# Patient Record
Sex: FEMALE | Race: WHITE | NOT HISPANIC OR LATINO | Employment: FULL TIME | URBAN - METROPOLITAN AREA
[De-identification: names, ages, dates, MRNs, and addresses within clinical notes are randomized per-mention and may not be internally consistent; named-entity substitution may affect disease eponyms.]

---

## 2018-03-30 ENCOUNTER — OFFICE VISIT (OUTPATIENT)
Dept: LAB | Age: 58
End: 2018-03-30
Payer: COMMERCIAL

## 2018-03-30 ENCOUNTER — TRANSCRIBE ORDERS (OUTPATIENT)
Dept: ADMINISTRATIVE | Age: 58
End: 2018-03-30

## 2018-03-30 DIAGNOSIS — E05.90 PRETIBIAL MYXEDEMA: ICD-10-CM

## 2018-03-30 DIAGNOSIS — E89.0 POSTSURGICAL HYPOTHYROIDISM: ICD-10-CM

## 2018-03-30 DIAGNOSIS — R73.01 IMPAIRED FASTING GLUCOSE: ICD-10-CM

## 2018-03-30 DIAGNOSIS — R73.01 IMPAIRED FASTING GLUCOSE: Primary | ICD-10-CM

## 2018-03-30 LAB
ATRIAL RATE: 52 BPM
P AXIS: 48 DEGREES
PR INTERVAL: 144 MS
QRS AXIS: 38 DEGREES
QRSD INTERVAL: 78 MS
QT INTERVAL: 424 MS
QTC INTERVAL: 394 MS
T WAVE AXIS: 38 DEGREES
VENTRICULAR RATE: 52 BPM

## 2018-03-30 PROCEDURE — 93005 ELECTROCARDIOGRAM TRACING: CPT

## 2018-05-14 ENCOUNTER — OFFICE VISIT (OUTPATIENT)
Dept: URGENT CARE | Age: 58
End: 2018-05-14
Payer: COMMERCIAL

## 2018-05-14 VITALS
RESPIRATION RATE: 18 BRPM | DIASTOLIC BLOOD PRESSURE: 60 MMHG | BODY MASS INDEX: 30.73 KG/M2 | TEMPERATURE: 98.1 F | SYSTOLIC BLOOD PRESSURE: 110 MMHG | HEIGHT: 66 IN | HEART RATE: 79 BPM | OXYGEN SATURATION: 96 % | WEIGHT: 191.2 LBS

## 2018-05-14 DIAGNOSIS — J06.9 VIRAL URI: Primary | ICD-10-CM

## 2018-05-14 PROCEDURE — S9083 URGENT CARE CENTER GLOBAL: HCPCS | Performed by: FAMILY MEDICINE

## 2018-05-14 PROCEDURE — G0382 LEV 3 HOSP TYPE B ED VISIT: HCPCS | Performed by: FAMILY MEDICINE

## 2018-05-14 RX ORDER — LEVOTHYROXINE SODIUM 0.1 MG/1
100 TABLET ORAL DAILY
COMMUNITY

## 2018-05-14 RX ORDER — CALCIUM CARBONATE 200(500)MG
3 TABLET,CHEWABLE ORAL DAILY
COMMUNITY

## 2018-05-14 RX ORDER — LEVOTHYROXINE SODIUM 0.12 MG/1
125 TABLET ORAL DAILY
COMMUNITY

## 2018-05-15 NOTE — PATIENT INSTRUCTIONS
As we discussed your illness is viral   No antibiotics are indicated at this time  Rest and drink extra fluids  OTC cough and cold medications as needed  Tylenol or Motrin as needed for pain or fever  Salt water gargles and throat lozenges for sore throat  Follow up with PCP if no improvement  Go to ER with worsening symptoms  Cold Symptoms   WHAT YOU NEED TO KNOW:   A cold is an infection caused by a virus  The infection causes your upper respiratory system to become inflamed  Common symptoms of a cold include sneezing, dry throat, a stuffy nose, headache, watery eyes, and a cough  Your cough may be dry, or you may cough up mucus  You may also have muscle aches, joint pain, and tiredness  Rarely, you may have a fever  Most colds go away without treatment  DISCHARGE INSTRUCTIONS:   Return to the emergency department if:   · You have increased tiredness and weakness  · You are unable to eat  · Your heart is beating much faster than usual for you  · You see white spots in the back of your throat and your neck is swollen and sore to the touch  · You see pinpoint or larger reddish-purple dots on your skin  Contact your healthcare provider if:   · You have a fever higher than 102°F (38 9°C)  · You have new or worsening shortness of breath  · You have thick nasal drainage for more than 2 days  · Your symptoms do not improve or get worse within 5 days  · You have questions or concerns about your condition or care  Medicines: The following medicines may be suggested by your healthcare provider to decrease your cold symptoms  These medicines are available without a doctor's order  Ask which medicines to take and when to take them  Follow directions  · NSAIDs or acetaminophen  help to bring down a fever or decrease pain  · Decongestants  help decrease nasal stuffiness  · Antihistamines  help decrease sneezing and a runny nose       · Cough suppressants  help decrease how much you cough  · Expectorants  help loosen mucus so you can cough it up  · Take your medicine as directed  Contact your healthcare provider if you think your medicine is not helping or if you have side effects  Tell him of her if you are allergic to any medicine  Keep a list of the medicines, vitamins, and herbs you take  Include the amounts, and when and why you take them  Bring the list or the pill bottles to follow-up visits  Carry your medicine list with you in case of an emergency  Symptom relief: The following may help relieve cold symptoms, such as a dry throat and congestion:  · Gargle with mouthwash or warm salt water as directed  · Suck on throat lozenges or hard candy  · Use a cold or warm vaporizer or humidifier to ease your breathing  · Rest for at least 2 days and then as needed to decrease tiredness and weakness  · Use petroleum based jelly around your nostrils to decrease irritation from blowing your nose  Drink liquids:  Liquids will help thin and loosen thick mucus so you can cough it up  Liquids will also keep you hydrated  Ask your healthcare provider which liquids are best for you and how much to drink each day  Prevent the spread of germs: You can spread your cold germs to others for at least 3 days after your symptoms start  Wash your hands often  Do not share items, such as eating utensils  Cover your nose and mouth when you cough or sneeze using the crook of your elbow instead of your hands  Throw used tissues in the garbage  Do not smoke:  Smoking may worsen your symptoms and increase the length of time you feel sick  Talk with your healthcare provider if you need help to stop smoking  Follow up with your healthcare provider as directed:  Write down your questions so you remember to ask them during your visits     © 2017 [employer] Information is for End User's use only and may not be sold, redistributed or otherwise used for commercial purposes  All illustrations and images included in CareNotes® are the copyrighted property of [employer]  or Cristopher Smith  The above information is an  only  It is not intended as medical advice for individual conditions or treatments  Talk to your doctor, nurse or pharmacist before following any medical regimen to see if it is safe and effective for you

## 2018-05-16 NOTE — PROGRESS NOTES
[facility]        NAME: Sharyle Sauce is a 59 y o  female  : 1960    MRN: [id number]  DATE: May 16, 2018  TIME: 12:51 PM    Assessment and Plan   Acute URI [J06 9]  1  Acute URI           Patient Instructions     Patient Instructions   As we discussed your illness is viral   No antibiotics are indicated at this time  Rest and drink extra fluids  OTC cough and cold medications as needed  Tylenol or Motrin as needed for pain or fever  Salt water gargles and throat lozenges for sore throat  Follow up with PCP if no improvement  Go to ER with worsening symptoms  Cold Symptoms   WHAT YOU NEED TO KNOW:   A cold is an infection caused by a virus  The infection causes your upper respiratory system to become inflamed  Common symptoms of a cold include sneezing, dry throat, a stuffy nose, headache, watery eyes, and a cough  Your cough may be dry, or you may cough up mucus  You may also have muscle aches, joint pain, and tiredness  Rarely, you may have a fever  Most colds go away without treatment  DISCHARGE INSTRUCTIONS:   Return to the emergency department if:   · You have increased tiredness and weakness  · You are unable to eat  · Your heart is beating much faster than usual for you  · You see white spots in the back of your throat and your neck is swollen and sore to the touch  · You see pinpoint or larger reddish-purple dots on your skin  Contact your healthcare provider if:   · You have a fever higher than 102°F (38 9°C)  · You have new or worsening shortness of breath  · You have thick nasal drainage for more than 2 days  · Your symptoms do not improve or get worse within 5 days  · You have questions or concerns about your condition or care  Medicines: The following medicines may be suggested by your healthcare provider to decrease your cold symptoms  These medicines are available without a doctor's order   Ask which medicines to take and when to take them  Follow directions  · NSAIDs or acetaminophen  help to bring down a fever or decrease pain  · Decongestants  help decrease nasal stuffiness  · Antihistamines  help decrease sneezing and a runny nose  · Cough suppressants  help decrease how much you cough  · Expectorants  help loosen mucus so you can cough it up  · Take your medicine as directed  Contact your healthcare provider if you think your medicine is not helping or if you have side effects  Tell him of her if you are allergic to any medicine  Keep a list of the medicines, vitamins, and herbs you take  Include the amounts, and when and why you take them  Bring the list or the pill bottles to follow-up visits  Carry your medicine list with you in case of an emergency  Symptom relief: The following may help relieve cold symptoms, such as a dry throat and congestion:  · Gargle with mouthwash or warm salt water as directed  · Suck on throat lozenges or hard candy  · Use a cold or warm vaporizer or humidifier to ease your breathing  · Rest for at least 2 days and then as needed to decrease tiredness and weakness  · Use petroleum based jelly around your nostrils to decrease irritation from blowing your nose  Drink liquids:  Liquids will help thin and loosen thick mucus so you can cough it up  Liquids will also keep you hydrated  Ask your healthcare provider which liquids are best for you and how much to drink each day  Prevent the spread of germs: You can spread your cold germs to others for at least 3 days after your symptoms start  Wash your hands often  Do not share items, such as eating utensils  Cover your nose and mouth when you cough or sneeze using the crook of your elbow instead of your hands  Throw used tissues in the garbage  Do not smoke:  Smoking may worsen your symptoms and increase the length of time you feel sick  Talk with your healthcare provider if you need help to stop smoking    Follow up with your healthcare provider as directed:  Write down your questions so you remember to ask them during your visits  © 2017 [employer] Information is for End User's use only and may not be sold, redistributed or otherwise used for commercial purposes  All illustrations and images included in CareNotes® are the copyrighted property of [employer]  or Cristopher Smith  The above information is an  only  It is not intended as medical advice for individual conditions or treatments  Talk to your doctor, nurse or pharmacist before following any medical regimen to see if it is safe and effective for you  Chief Complaint     Chief Complaint   Patient presents with    Cold Like Symptoms     Since Friday - burning throat that subsided yesterday, foul taste now with nasal congestion and PND and tight, dry cough   Cough         History of Present Illness   Tarah Estrella presents to the clinic c/o    This is a 59year old female here today with cold symptoms  She states symptoms started about 3 days ago with burning in her throat  This symptoms has resolved  She now has dry cough, chest tightness  She has nasal congestion with post nasal drip  No fever, body aches but has had sweats  Review of Systems   Review of Systems   Constitutional: Positive for activity change, chills and fatigue  Negative for fever  HENT: Positive for congestion, sinus pain, sinus pressure and sore throat  Respiratory: Positive for cough and chest tightness  Cardiovascular: Negative  Neurological: Negative  Psychiatric/Behavioral: Negative            Current Medications     Long-Term Prescriptions   Medication Sig Dispense Refill    Cholecalciferol (VITAMIN D3) 2000 units CHEW Chew 2 tablets daily         Current Allergies     Allergies as of 05/14/2018    (No Known Allergies)            The following portions of the patient's history were reviewed and updated as appropriate: allergies, current medications, past family history, past medical history, past social history, past surgical history and problem list     Objective   /60 (BP Location: Right arm, Patient Position: Sitting, Cuff Size: Standard)   Pulse 79   Temp 98 1 °F (36 7 °C) (Oral)   Resp 18   Ht 5' 6" (1 676 m)   Wt 86 7 kg (191 lb 3 2 oz)   SpO2 96%   BMI 30 86 kg/m²        Physical Exam     Physical Exam   Constitutional: She is oriented to person, place, and time  She appears well-developed and well-nourished  HENT:   Head: Normocephalic  Right Ear: External ear normal    Posterior pharynx mildly erythemic    Neck: Normal range of motion  Neck supple  Cardiovascular: Normal rate, regular rhythm and normal heart sounds  Pulmonary/Chest: Effort normal    Neurological: She is alert and oriented to person, place, and time  Psychiatric: She has a normal mood and affect  Her behavior is normal    Nursing note and vitals reviewed

## 2018-06-20 ENCOUNTER — OFFICE VISIT (OUTPATIENT)
Dept: URGENT CARE | Age: 58
End: 2018-06-20
Payer: COMMERCIAL

## 2018-06-20 VITALS
DIASTOLIC BLOOD PRESSURE: 78 MMHG | OXYGEN SATURATION: 95 % | BODY MASS INDEX: 30.53 KG/M2 | HEIGHT: 66 IN | TEMPERATURE: 98.2 F | RESPIRATION RATE: 16 BRPM | WEIGHT: 190 LBS | SYSTOLIC BLOOD PRESSURE: 112 MMHG | HEART RATE: 70 BPM

## 2018-06-20 DIAGNOSIS — J01.90 ACUTE SINUSITIS, RECURRENCE NOT SPECIFIED, UNSPECIFIED LOCATION: Primary | ICD-10-CM

## 2018-06-20 PROCEDURE — G0382 LEV 3 HOSP TYPE B ED VISIT: HCPCS | Performed by: NURSE PRACTITIONER

## 2018-06-20 PROCEDURE — S9083 URGENT CARE CENTER GLOBAL: HCPCS | Performed by: NURSE PRACTITIONER

## 2018-06-20 RX ORDER — AMOXICILLIN AND CLAVULANATE POTASSIUM 875; 125 MG/1; MG/1
1 TABLET, FILM COATED ORAL EVERY 12 HOURS SCHEDULED
Qty: 20 TABLET | Refills: 0 | Status: SHIPPED | OUTPATIENT
Start: 2018-06-20 | End: 2018-06-20 | Stop reason: SDUPTHER

## 2018-06-20 RX ORDER — AMOXICILLIN AND CLAVULANATE POTASSIUM 875; 125 MG/1; MG/1
1 TABLET, FILM COATED ORAL EVERY 12 HOURS SCHEDULED
Qty: 20 TABLET | Refills: 0 | Status: SHIPPED | COMMUNITY
Start: 2018-06-20 | End: 2018-06-30

## 2018-06-20 NOTE — PATIENT INSTRUCTIONS
Rest and drink extra fluids  Start antibiotic  Take probiotic  OTC Flonase and antihistamine  Tylenol or Motrin as needed for pain  Follow up with PCP if no improvement  Go to ER with worsening symptoms

## 2018-06-20 NOTE — PROGRESS NOTES
[facility]        NAME: Tracy Atkinson is a 59 y o  female  : 1960    MRN: [id number]  DATE: 2018  TIME: 1:51 PM    Assessment and Plan   Acute sinusitis, recurrence not specified, unspecified location [J01 90]  1  Acute sinusitis, recurrence not specified, unspecified location  amoxicillin-clavulanate (AUGMENTIN) 875-125 mg per tablet    DISCONTINUED: amoxicillin-clavulanate (AUGMENTIN) 875-125 mg per tablet         Patient Instructions     Patient Instructions   Rest and drink extra fluids  Start antibiotic  Take probiotic  OTC Flonase and antihistamine  Tylenol or Motrin as needed for pain  Follow up with PCP if no improvement  Go to ER with worsening symptoms  Chief Complaint     Chief Complaint   Patient presents with    Sinusitis     Pt here for possible sinus infection, cough, post nasal drip         History of Present Illness   Tracy Atkinson presents to the clinic c/o    This is a 59year old female here today with complaints of sinus pressure and congestion  Symptoms started about 4 days go  She has body aches and chills  No fever  She has sore throat and ear pain  She has cough but no SOB or difficulty breathing  She is having yellow discharge from right nare  Review of Systems   Review of Systems   Constitutional: Positive for chills and fatigue  HENT: Positive for congestion, sinus pain and sinus pressure  Respiratory: Positive for cough  Negative for chest tightness and wheezing            Current Medications     Long-Term Prescriptions   Medication Sig Dispense Refill    Cholecalciferol (VITAMIN D3) 2000 units CHEW Chew 2 tablets daily         Current Allergies     Allergies as of 2018    (No Known Allergies)            The following portions of the patient's history were reviewed and updated as appropriate: allergies, current medications, past family history, past medical history, past social history, past surgical history and problem list     Objective   /78   Pulse 70   Temp 98 2 °F (36 8 °C)   Resp 16   Ht 5' 6" (1 676 m)   Wt 86 2 kg (190 lb)   SpO2 95%   BMI 30 67 kg/m²        Physical Exam     Physical Exam   Constitutional: She is oriented to person, place, and time  She appears well-developed and well-nourished  HENT:   Head: Normocephalic  Right Ear: External ear normal    Left Ear: External ear normal    Mouth/Throat: Oropharynx is clear and moist    TTP over sinuses  Neck: Normal range of motion  Cardiovascular: Normal rate, regular rhythm and normal heart sounds  Pulmonary/Chest: Effort normal and breath sounds normal    Neurological: She is alert and oriented to person, place, and time  Skin: Skin is warm  Psychiatric: She has a normal mood and affect   Her behavior is normal

## 2019-08-09 ENCOUNTER — OFFICE VISIT (OUTPATIENT)
Dept: OBGYN CLINIC | Facility: CLINIC | Age: 59
End: 2019-08-09
Payer: COMMERCIAL

## 2019-08-09 VITALS
WEIGHT: 186 LBS | DIASTOLIC BLOOD PRESSURE: 76 MMHG | BODY MASS INDEX: 29.89 KG/M2 | SYSTOLIC BLOOD PRESSURE: 130 MMHG | HEIGHT: 66 IN

## 2019-08-09 DIAGNOSIS — Z12.31 ENCOUNTER FOR SCREENING MAMMOGRAM FOR MALIGNANT NEOPLASM OF BREAST: Primary | ICD-10-CM

## 2019-08-09 DIAGNOSIS — Z01.419 ENCOUNTER FOR GYNECOLOGICAL EXAMINATION (GENERAL) (ROUTINE) WITHOUT ABNORMAL FINDINGS: ICD-10-CM

## 2019-08-09 PROCEDURE — S0610 ANNUAL GYNECOLOGICAL EXAMINA: HCPCS | Performed by: OBSTETRICS & GYNECOLOGY

## 2019-08-09 PROCEDURE — G0145 SCR C/V CYTO,THINLAYER,RESCR: HCPCS | Performed by: OBSTETRICS & GYNECOLOGY

## 2019-08-09 RX ORDER — LEVOTHYROXINE SODIUM 88 UG/1
TABLET ORAL
COMMUNITY
Start: 2019-08-08

## 2019-08-09 RX ORDER — LEVOTHYROXINE SODIUM 88 UG/1
88 TABLET ORAL DAILY
COMMUNITY

## 2019-08-09 NOTE — PATIENT INSTRUCTIONS
This 49-year-old patient was told that her breast  exam was normal  Her pelvic exam revealed the presence of uterine fibroid 6-8 weeks gestation size

## 2019-08-09 NOTE — PROGRESS NOTES
Assessment/Plan: This 56-year-old patient is seen for gyn evaluation  She has no specific complaints at this time except for an injury to her 1 toe  No problem-specific Assessment & Plan notes found for this encounter  Subjective:      Patient ID: Theresa Cook is a 59 y o  female  This 56-year-old patient has had no vaginal bleeding over the past 8 years  She has had no vaginal infections during this time  [place] is occurring and is comfortable most of the time  She was encouraged to consider using lubricants if needed  She has no chronic headaches hot flashes or fainting spells  Her bowel function is normal and she does not bleed with the bowels or use laxatives  She does not wear liners or pads routinely for urine stress incontinence  She has had no urinary tract infections over the past year  Review of Systems   Constitutional: Negative  HENT: Negative  Eyes: Negative  Respiratory: Negative  Cardiovascular: Negative  Gastrointestinal: Negative  Endocrine: Negative  Genitourinary: Negative  Musculoskeletal: Negative  Skin: Negative  Allergic/Immunologic: Negative  Neurological: Negative  Hematological: Negative  Psychiatric/Behavioral: Negative  Objective:      /76 (BP Location: Left arm, Patient Position: Sitting, Cuff Size: Standard)   Ht 5' 6" (1 676 m)   Wt 84 4 kg (186 lb)   BMI 30 02 kg/m²          Physical Exam   Constitutional: She is oriented to person, place, and time  She appears well-developed and well-nourished  HENT:   Head: Normocephalic  Neck: Normal range of motion  Neck supple  Cardiovascular: Normal rate, regular rhythm, normal heart sounds and intact distal pulses  Pulmonary/Chest: Effort normal and breath sounds normal    Abdominal: Soft  Bowel sounds are normal    Genitourinary:   Genitourinary Comments: Her uterus is enlarged with fibroids in the size of a 6 week gestation  Musculoskeletal: Normal range of motion  Neurological: She is alert and oriented to person, place, and time  Skin: Skin is warm and dry  Psychiatric: She has a normal mood and affect  Nursing note and vitals reviewed  breast exam is normal   Pelvic exam revealed uterus to be anterior and enlarged to the size of a 6 day week gestation  No adnexal masses are identified  The cervix was normal to appearance  There is no blood or discharge in the vagina  The vulva is normal  Rectal exam shows no masses or blood in the rectum and no nodularity in the cul-de-sac

## 2019-08-13 LAB
LAB AP GYN PRIMARY INTERPRETATION: NORMAL
Lab: NORMAL

## 2019-10-08 ENCOUNTER — OFFICE VISIT (OUTPATIENT)
Dept: PODIATRY | Facility: CLINIC | Age: 59
End: 2019-10-08
Payer: COMMERCIAL

## 2019-10-08 VITALS
SYSTOLIC BLOOD PRESSURE: 90 MMHG | DIASTOLIC BLOOD PRESSURE: 55 MMHG | WEIGHT: 185 LBS | HEART RATE: 81 BPM | BODY MASS INDEX: 29.73 KG/M2 | HEIGHT: 66 IN

## 2019-10-08 DIAGNOSIS — L60.3 NAIL DYSTROPHY: Primary | ICD-10-CM

## 2019-10-08 PROCEDURE — 99202 OFFICE O/P NEW SF 15 MIN: CPT | Performed by: PODIATRIST

## 2019-10-08 NOTE — PROGRESS NOTES
Assessment/Plan:  Explained to patient that the great toenails are dystrophic due to trauma  No treatment needed due to paucity of symptoms  Should the nails become painful in the future, total matrixectomy would be recommended  No problem-specific Assessment & Plan notes found for this encounter  Diagnoses and all orders for this visit:    Nail dystrophy          Subjective:      Patient ID: Tawanna Sanz is a 62 y o  female  HPI     Patient presents with concern regarding the condition of her great toenails especially the left great toenail  Patient notes that the nails are loose  She notes that she is injury to these toenails on multiple occasions over the years  They are not painful  The left great toenail is more severely affected than the right  The following portions of the patient's history were reviewed and updated as appropriate: allergies, current medications, past family history, past medical history, past social history, past surgical history and problem list     Review of Systems   Respiratory: Negative  Cardiovascular: Negative  Gastrointestinal: Negative  Musculoskeletal: Negative  Skin: Negative  Neurological: Negative  Objective:      BP 90/55   Pulse 81   Ht 5' 6" (1 676 m)   Wt 83 9 kg (185 lb)   BMI 29 86 kg/m²          Physical Exam   Cardiovascular: Regular rhythm and intact distal pulses  Musculoskeletal: Normal range of motion  Skin:   Each great toenail exhibits onycholysis with associated changes in the colors of the toenails  The left is more severely afflicted than the right  No pain with pressure  No evidence of paronychia

## 2021-06-15 ENCOUNTER — ANNUAL EXAM (OUTPATIENT)
Dept: OBGYN CLINIC | Facility: CLINIC | Age: 61
End: 2021-06-15
Payer: COMMERCIAL

## 2021-06-15 VITALS — WEIGHT: 203.8 LBS | BODY MASS INDEX: 32.89 KG/M2 | DIASTOLIC BLOOD PRESSURE: 78 MMHG | SYSTOLIC BLOOD PRESSURE: 118 MMHG

## 2021-06-15 DIAGNOSIS — N84.1 CERVICAL POLYP: ICD-10-CM

## 2021-06-15 DIAGNOSIS — Z01.419 ENCOUNTER FOR GYNECOLOGICAL EXAMINATION (GENERAL) (ROUTINE) WITHOUT ABNORMAL FINDINGS: Primary | ICD-10-CM

## 2021-06-15 DIAGNOSIS — Z12.31 SCREENING MAMMOGRAM, ENCOUNTER FOR: ICD-10-CM

## 2021-06-15 PROCEDURE — G0145 SCR C/V CYTO,THINLAYER,RESCR: HCPCS | Performed by: OBSTETRICS & GYNECOLOGY

## 2021-06-15 PROCEDURE — 11421 EXC H-F-NK-SP B9+MARG 0.6-1: CPT | Performed by: OBSTETRICS & GYNECOLOGY

## 2021-06-15 PROCEDURE — 88305 TISSUE EXAM BY PATHOLOGIST: CPT | Performed by: PATHOLOGY

## 2021-06-15 PROCEDURE — S0612 ANNUAL GYNECOLOGICAL EXAMINA: HCPCS | Performed by: OBSTETRICS & GYNECOLOGY

## 2021-06-15 NOTE — PATIENT INSTRUCTIONS
This 27-year-old patient was told that her breast exam did show the presence of palpable fibrocystic areas in the part of both breasts  Her mammogram from September 21, 2020 was normal   She will have another mammogram done this September or October  Her pelvic exam revealed the presence of a 1 cm diameter endocervical polyp which was skin colored and had no bleeding associated with it  The polyp was removed with the tenaculum  I will let her  know what the results are  When pathology reports it  She was advised not to have intercourse for 1 week and wear a pad for clothing protection for day or 2 if there is any bleeding

## 2021-06-15 NOTE — PROGRESS NOTES
Assessment/Plan: This 55-year-old patient has had no vaginal bleeding or vaginal infections over the past year  She is seen for her routine gyn evaluation at this time  No problem-specific Assessment & Plan notes found for this encounter  Subjective:      Patient ID: Nicolette Lange is a 56 y o  female  This 55-year-old patient has had no vaginal bleeding or episodes of vaginitis over the past year  She does have discomfort with intercourse even with the use of lubricant  She has no chronic headaches or hot flashes or fainting spells  Her weight is 203 lb her blood pressure 118/78  Her bowel activity is normal and she does not bleed with the bowels or use laxatives on a regular basis  She does not wear liners or pads routinely  She has no urine stress incontinence  She has had no urinary tract infections over the past year  She had a thyroidectomy in 2016 and a colon polyp in 2018 at the time of a colonoscopy  Her last mammogram was September 21, 2020 and was normal   She sleeps about 7 hours at night  Review of Systems   Constitutional: Negative  HENT: Negative  Eyes: Negative  Respiratory: Negative  Cardiovascular: Negative  Gastrointestinal: Negative  Endocrine: Negative  Genitourinary: Negative  Musculoskeletal: Negative  Skin: Negative  Allergic/Immunologic: Negative  Neurological: Negative  Hematological: Negative  Objective:      /78 (BP Location: Left arm, Patient Position: Sitting, Cuff Size: Large)   Wt 92 4 kg (203 lb 12 8 oz)   BMI 32 89 kg/m²          Physical Exam  Vitals and nursing note reviewed  Constitutional:       Appearance: Normal appearance  HENT:      Head: Normocephalic  Nose: Nose normal       Mouth/Throat:      Mouth: Mucous membranes are moist    Eyes:      Extraocular Movements: Extraocular movements intact  Pupils: Pupils are equal, round, and reactive to light     Cardiovascular: Rate and Rhythm: Normal rate and regular rhythm  Pulses: Normal pulses  Heart sounds: Normal heart sounds  No murmur heard  Pulmonary:      Effort: Pulmonary effort is normal       Breath sounds: Normal breath sounds  Abdominal:      General: Abdomen is flat  Bowel sounds are normal       Palpations: Abdomen is soft  Genitourinary:     General: Normal vulva  Rectum: Normal       Comments:   Breast exam reveals the presence of palpable fibrocystic areas in the upper part of both breasts  There is no evidence of nipple inversion or discoloration of the skin of the breasts  Pelvic exam reveals uterus to be anterior mobile and irregular with fibroids the size of a 6 week gestation  No adnexal masses are identified  The cervix shows presence of an endocervical polyp about 1 cm in order  This was grasped with a tenaculum twisted and removed for evaluation  Pap smear was taken at this time  There was no blood or discharge in the vagina  The vulva was normal   Rectal exam showed no masses or blood in the rectum and no nodularity in the cul-de-sac  Musculoskeletal:         General: Normal range of motion  Cervical back: Normal range of motion  Skin:     General: Skin is warm and dry  Neurological:      General: No focal deficit present  Mental Status: She is alert and oriented to person, place, and time     Psychiatric:         Mood and Affect: Mood normal          Behavior: Behavior normal

## 2021-06-18 ENCOUNTER — TELEPHONE (OUTPATIENT)
Dept: OBGYN CLINIC | Facility: CLINIC | Age: 61
End: 2021-06-18

## 2021-06-18 LAB
LAB AP GYN PRIMARY INTERPRETATION: NORMAL
Lab: NORMAL

## 2021-06-18 NOTE — TELEPHONE ENCOUNTER
----- Message from Mabel Busch MD sent at 6/18/2021  9:50 AM EDT -----  Please call the patient regarding her   Polypectomy  Are endocervical polyp was described as benign by the pathologist and requires no further treatment

## 2021-09-20 ENCOUNTER — NURSE TRIAGE (OUTPATIENT)
Dept: OTHER | Facility: OTHER | Age: 61
End: 2021-09-20

## 2021-09-20 ENCOUNTER — APPOINTMENT (OUTPATIENT)
Dept: URGENT CARE | Age: 61
End: 2021-09-20
Payer: COMMERCIAL

## 2021-09-20 DIAGNOSIS — Z11.52 ENCOUNTER FOR SCREENING FOR COVID-19: Primary | ICD-10-CM

## 2021-09-21 LAB — SARS-COV-2 RNA RESP QL NAA+PROBE: NEGATIVE

## 2021-09-21 NOTE — TELEPHONE ENCOUNTER
Reason for Disposition   General information question, no triage required and triager able to answer question    Answer Assessment - Initial Assessment Questions  1  REASON FOR CALL or QUESTION: "What is your reason for calling today?" or "How can I best help you?" or "What question do you have that I can help answer?"      Requesting a travel covid swab      Protocols used: INFORMATION ONLY CALL - NO TRIAGE-ADULT-

## 2021-09-21 NOTE — TELEPHONE ENCOUNTER
Regarding: Travel Swab  ----- Message from [facility] sent at 9/20/2021  7:28 PM EDT -----  "I am calling for a travel swab, I am traveling Saturday "

## 2024-09-19 ENCOUNTER — OFFICE VISIT (OUTPATIENT)
Dept: OBGYN CLINIC | Facility: CLINIC | Age: 64
End: 2024-09-19
Payer: MEDICARE

## 2024-09-19 VITALS
WEIGHT: 201.2 LBS | DIASTOLIC BLOOD PRESSURE: 64 MMHG | BODY MASS INDEX: 33.52 KG/M2 | SYSTOLIC BLOOD PRESSURE: 108 MMHG | HEIGHT: 65 IN

## 2024-09-19 DIAGNOSIS — Z78.0 MENOPAUSE: ICD-10-CM

## 2024-09-19 DIAGNOSIS — Z01.419 ENCOUNTER FOR GYNECOLOGICAL EXAMINATION WITHOUT ABNORMAL FINDING: Primary | ICD-10-CM

## 2024-09-19 DIAGNOSIS — Z12.31 ENCOUNTER FOR SCREENING MAMMOGRAM FOR MALIGNANT NEOPLASM OF BREAST: ICD-10-CM

## 2024-09-19 DIAGNOSIS — Z13.820 SCREENING FOR OSTEOPOROSIS: ICD-10-CM

## 2024-09-19 PROCEDURE — G0101 CA SCREEN;PELVIC/BREAST EXAM: HCPCS | Performed by: OBSTETRICS & GYNECOLOGY

## 2024-09-19 RX ORDER — LEVOTHYROXINE SODIUM 112 UG/1
112 TABLET ORAL DAILY
COMMUNITY

## 2024-09-19 NOTE — PROGRESS NOTES
Katarzyna Negroclaudio   1960    CC:  Yearly exam    S:  64 y.o. female here for yearly exam. She previously saw Dr Chisholm but has not been back in more than 3 years.      She is postmenopausal and has had no vaginal bleeding.  She denies vaginal discharge, itching, odor or dryness.     Sexual activity: She is sexually active without pain, bleeding or dryness.     Last Pap: 6/15/2021 - normal (no HPV done)  Last Mammo: 3/22/2024 - BIRAD-1  Last Colonoscopy: 2024 outside network; 5 years per patient  Last DEXA: never    We reviewed ASCCP guidelines for Pap testing.       Current Outpatient Medications:     Ascorbic Acid 500 MG CAPS, Take 500 mg by mouth daily, Disp: , Rfl:     Cholecalciferol (VITAMIN D3) 2000 units CHEW, Chew 2 tablets daily, Disp: , Rfl:     levothyroxine 100 mcg tablet, Take 100 mcg by mouth daily, Disp: , Rfl:     levothyroxine 112 mcg tablet, Take 112 mcg by mouth daily, Disp: , Rfl:     magnesium hydroxide (ANTONY CHEWS) 311 MG CHEW, Chew 311 mg every 4 (four) hours as needed, Disp: , Rfl:     calcium carbonate (TUMS) 500 mg chewable tablet, Chew 3 tablets daily (Patient not taking: Reported on 9/19/2024), Disp: , Rfl:     Cholecalciferol 23460 units CAPS, Take 10,000 Units by mouth daily (Patient not taking: Reported on 9/19/2024), Disp: , Rfl:     levothyroxine 125 mcg tablet, Take 125 mcg by mouth daily (Patient not taking: Reported on 9/19/2024), Disp: , Rfl:     levothyroxine 88 mcg tablet, , Disp: , Rfl:     levothyroxine 88 mcg tablet, Take 88 mcg by mouth daily (Patient not taking: Reported on 9/19/2024), Disp: , Rfl:   Social History     Socioeconomic History    Marital status: /Civil Union     Spouse name: Not on file    Number of children: Not on file    Years of education: Not on file    Highest education level: Not on file   Occupational History    Not on file   Tobacco Use    Smoking status: Former     Current packs/day: 0.00     Types: Cigarettes     Quit date: 2006      Years since quittin.7    Smokeless tobacco: Never   Vaping Use    Vaping status: Never Used   Substance and Sexual Activity    Alcohol use: No    Drug use: No    Sexual activity: Yes     Partners: Male     Birth control/protection: Post-menopausal   Other Topics Concern    Not on file   Social History Narrative    Not on file     Social Determinants of Health     Financial Resource Strain: Low Risk  (2022)    Received from [facility]    Overall Financial Resource Strain (CARDIA)     Difficulty of Paying Living Expenses: Not hard at all   Food Insecurity: No Food Insecurity (2022)    Received from [facility]    Hunger Vital Sign     Worried About Running Out of Food in the Last Year: Never true     Ran Out of Food in the Last Year: Never true   Transportation Needs: No Transportation Needs (2022)    Received from [facility]    PRAPARE - Transportation     Lack of Transportation (Medical): No     Lack of Transportation (Non-Medical): No   Physical Activity: Not on file   Stress: No Stress Concern Present (2022)    Received from [facility]    [place] [employer] - Occupational Stress Questionnaire     Feeling of Stress : Not at all   Social Connections: Socially Integrated (2022)    Received from [facility]    Social Connection and Isolation Panel [NHANES]     Frequency of Communication with Friends and Family: More than three times a week     Frequency of Social Gatherings with Friends and Family: Three times a week     Attends [employer] Services: More than 4 times per year     Active Member of Clubs or Organizations: Yes     Attends Club or Organization Meetings: More than 4 times per year     Marital Status:    Intimate Partner Violence: Not At Risk (2022)    Received from [facility]    Humiliation, Afraid, Rape, and Kick questionnaire     Fear of  "Current or Ex-Partner: No     Emotionally Abused: No     Physically Abused: No     Sexually Abused: No   Housing Stability: Not on file     Family History   Problem Relation Age of Onset    COPD Mother     Lung disease Father     Heart attack Father     Diabetes Father     Cancer Sister         skin     Past Medical History:   Diagnosis Date    Asthma     Leg cramps     Leiomyoma     Meningitis     age 6 months    Pulmonary embolism (HCC)     Thyroid disease         Review of Systems   Respiratory: Negative.    Cardiovascular: Negative.    Gastrointestinal: Negative for constipation and diarrhea.   Genitourinary: Negative for difficulty urinating, pelvic pain, vaginal bleeding, vaginal discharge, itching or odor.    O:  Blood pressure 108/64, height 5' 4.5\" (1.638 m), weight 91.3 kg (201 lb 3.2 oz).    Patient appears well and is not in distress  Neck is supple without masses  Breasts are symmetrical without mass, tenderness, nipple discharge, skin changes or adenopathy.   Abdomen is soft and nontender without masses.   External genitals are normal without lesions or rashes.  Urethral meatus and urethra are normal  Bladder is normal to palpation  Vagina is normal without discharge or bleeding.   Cervix is normal without discharge or lesion.   Uterus is normal, mobile, nontender without palpable mass.  Adnexa are normal, nontender, without palpable mass.     A:   Yearly exam.     P:   Pap no longer indicated  Mammo ordered   Colonoscopy due 2029   DEXA ordered    RTO one year for yearly exam or sooner as needed.     "

## 2025-08-12 ENCOUNTER — ANNUAL EXAM (OUTPATIENT)
Dept: OBGYN CLINIC | Facility: CLINIC | Age: 65
End: 2025-08-12
Payer: COMMERCIAL

## 2025-08-19 ENCOUNTER — TELEPHONE (OUTPATIENT)
Age: 65
End: 2025-08-19

## 2025-08-21 ENCOUNTER — TELEPHONE (OUTPATIENT)
Age: 65
End: 2025-08-21